# Patient Record
Sex: MALE | Race: WHITE | Employment: UNEMPLOYED | URBAN - METROPOLITAN AREA
[De-identification: names, ages, dates, MRNs, and addresses within clinical notes are randomized per-mention and may not be internally consistent; named-entity substitution may affect disease eponyms.]

---
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
## 2022-02-17 ENCOUNTER — OFFICE VISIT (OUTPATIENT)
Dept: PEDIATRIC ENDOCRINOLOGY | Age: 22
End: 2022-02-17
Payer: MEDICAID

## 2022-02-17 VITALS
BODY MASS INDEX: 21.06 KG/M2 | DIASTOLIC BLOOD PRESSURE: 71 MMHG | HEIGHT: 72 IN | OXYGEN SATURATION: 100 % | TEMPERATURE: 98.1 F | WEIGHT: 155.5 LBS | SYSTOLIC BLOOD PRESSURE: 137 MMHG | HEART RATE: 106 BPM | RESPIRATION RATE: 17 BRPM

## 2022-02-17 DIAGNOSIS — E03.9 PRIMARY HYPOTHYROIDISM: Primary | ICD-10-CM

## 2022-02-17 PROCEDURE — 99214 OFFICE O/P EST MOD 30 MIN: CPT | Performed by: PEDIATRICS

## 2022-02-17 NOTE — PATIENT INSTRUCTIONS
The signs and symptoms of hypothyroidism include:     Tiredness  Modest weight gain (no more than 5-10 lb)  Feeling cold  Dry skin  Hair loss  Constipation  Often, your childs doctor will be able to palpate an enlarged thyroid  gland in the neck. This is called a goiter.   Avoid soy products, iron tablets, calcium tablets and MVI 3-4 hours of either side of taking thyroid medication

## 2022-02-17 NOTE — PROGRESS NOTES
Chief Complaint   Patient presents with    Follow-up     Thyroid     Patient states that he takes vitamin C, B, D3, and garlic supplement. Unsure of specific dosages.

## 2022-02-17 NOTE — PROGRESS NOTES
Cc: 1. Primary hypothyroidism     HOP:   1. Patient is 21 years and 2 months old with primary hypothyroidism and is on levothyroxine 37.5 mcg (1.5 tab of 25 mcg) per day. Compliance with medication is better, misses 1 day per week. Patient takes the medication in the afternoon. Patient has good energy, has normal bowel movements. ROS: no bone pain, muscle cramps  no abdominal pain, normal bowel movements Good energy, no weakness      Family history: diabetes: yes, thyroid dysfunction: no. Social history:working at [employer]. Visit Vitals  /71 (BP 1 Location: Right arm, BP Patient Position: Sitting)   Pulse (!) 106   Temp 98.1 °F (36.7 °C) (Oral)   Resp 17   Ht 6' 0.13\" (1.832 m)   Wt 155 lb 8 oz (70.5 kg)   SpO2 100%   BMI 21.02 kg/m²   Neck is supple, no lymphadenopathy, no thyromegaly, no dark circles around the neck   Pulse equal and normal rhythm  Abdomen is nondistended   Repeat pulse= 90/min and he is anxious    Labs from last visit reviewed:   Lab Results   Component Value Date/Time    TSH 2.230 05/21/2021 04:11 PM   Neck is supple, no lymphadenopathy, no thyromegaly, no dark circles around the neck, pulse equal and normal rhythm  Abdomen is soft, nondistended      Labs from last visit reviewed:         Lab Results   Component Value Date/Time     TSH 3.010 12/01/2020 03:53 PM         A/P:   1. Primary hypothyroidism: will check free T4 and TSH. Continue levothyroxine at 37.5 mcg per day, importance of taking the medication and effect on linear growth and metabolism was reviewed, patient expressed understanding. 2.  Anxious and has elevated pulse and blood pressure requested mom to get blood pressure reading from the PCP office or to the pharmacy and call and update us and mom expressed understanding.  He gest anxious every time he comes to doctor visit.       The signs and symptoms of hypothyroidism include:     Tiredness  Modest weight gain (no more than 5-10 lb)  Feeling cold  Dry skin  Hair loss  Constipation  Poor growth  Often, your childs doctor will be able to palpate an enlarged thyroid  gland in the neck. This is called a goiter. Avoid soy products, iron tablets, calcium tablets and MVI 3-4 hours of either side of taking thyroid medication. Follow up in 9 months.

## 2022-02-18 LAB — TSH SERPL DL<=0.005 MIU/L-ACNC: 3.06 UIU/ML (ref 0.45–4.5)

## 2022-03-18 PROBLEM — F84.5 ASPERGER'S DISORDER: Status: ACTIVE | Noted: 2018-09-07

## 2022-03-19 PROBLEM — R11.0 CHRONIC NAUSEA: Status: ACTIVE | Noted: 2018-09-07

## 2022-03-19 PROBLEM — F41.9 ANXIETY: Status: ACTIVE | Noted: 2018-09-07

## 2022-04-08 RX ORDER — LEVOTHYROXINE SODIUM 25 UG/1
TABLET ORAL
Qty: 135 TABLET | Refills: 4 | Status: SHIPPED | OUTPATIENT
Start: 2022-04-08

## 2022-10-17 ENCOUNTER — OFFICE VISIT (OUTPATIENT)
Dept: URGENT CARE | Age: 22
End: 2022-10-17
Payer: MEDICAID

## 2022-10-17 VITALS
TEMPERATURE: 98.2 F | WEIGHT: 150 LBS | OXYGEN SATURATION: 100 % | BODY MASS INDEX: 20.27 KG/M2 | SYSTOLIC BLOOD PRESSURE: 132 MMHG | DIASTOLIC BLOOD PRESSURE: 72 MMHG | RESPIRATION RATE: 18 BRPM | HEART RATE: 79 BPM

## 2022-10-17 DIAGNOSIS — K52.9 ACUTE GASTROENTERITIS: Primary | ICD-10-CM

## 2022-10-17 PROCEDURE — 99203 OFFICE O/P NEW LOW 30 MIN: CPT | Performed by: NURSE PRACTITIONER

## 2022-10-17 NOTE — PROGRESS NOTES
Abdominal Pain   The history is provided by the Patient. This is a new problem. The current episode started 12 to 24 hours ago. The problem has not changed since onset. The pain is at a severity of 0/10. Associated symptoms include diarrhea and nausea. Pertinent negatives include no fever. Onset this morning. Diarrhea x 2. Saw mucous, no blood. No fevers. No other family members affected. No treatment to date. Past Medical History:   Diagnosis Date    Asperger's disorder         History reviewed. No pertinent surgical history. Family History   Problem Relation Age of Onset    No Known Problems Mother     No Known Problems Father         Social History     Socioeconomic History    Marital status: SINGLE     Spouse name: Not on file    Number of children: Not on file    Years of education: Not on file    Highest education level: Not on file   Occupational History    Not on file   Tobacco Use    Smoking status: Never    Smokeless tobacco: Never   Substance and Sexual Activity    Alcohol use: No    Drug use: No    Sexual activity: Never   Other Topics Concern    Not on file   Social History Narrative    Not on file     Social Determinants of Health     Financial Resource Strain: Not on file   Food Insecurity: Not on file   Transportation Needs: Not on file   Physical Activity: Not on file   Stress: Not on file   Social Connections: Not on file   Intimate Partner Violence: Not on file   Housing Stability: Not on file                ALLERGIES: Ibuprofen    Review of Systems   Constitutional:  Negative for chills and fever. HENT:  Negative for congestion and sore throat. Respiratory:  Negative for cough. Gastrointestinal:  Positive for abdominal pain, diarrhea and nausea.      Vitals:    10/17/22 0956 10/17/22 0958   BP:  132/72   Pulse:  79   Resp:  18   Temp:  98.2 °F (36.8 °C)   SpO2:  100%   Weight: 150 lb (68 kg)        Physical Exam  Constitutional:       General: He is not in acute distress. Appearance: Normal appearance. He is not ill-appearing or toxic-appearing. HENT:      Head: Normocephalic and atraumatic. Nose: Nose normal.      Mouth/Throat:      Mouth: Mucous membranes are moist.      Pharynx: Oropharynx is clear. Eyes:      Extraocular Movements: Extraocular movements intact. Conjunctiva/sclera: Conjunctivae normal.      Pupils: Pupils are equal, round, and reactive to light. Cardiovascular:      Rate and Rhythm: Normal rate and regular rhythm. Pulmonary:      Effort: Pulmonary effort is normal.      Breath sounds: Normal breath sounds. Abdominal:      General: Abdomen is flat. Bowel sounds are increased. There is no distension. Palpations: Abdomen is soft. Tenderness: There is no abdominal tenderness. There is no guarding or rebound. Musculoskeletal:      Cervical back: Normal range of motion and neck supple. Lymphadenopathy:      Cervical: No cervical adenopathy. Skin:     General: Skin is warm and dry. Neurological:      Mental Status: He is alert. ICD-10-CM ICD-9-CM   1. Acute gastroenteritis  K52.9 558.9       No orders of the defined types were placed in this encounter. Clear liquids, crackers/toast, advance to bland diet slowly. May try pepto bismol or imodium as needed. The patient is to follow up with PCP. If signs and symptoms become worse the pt is to go to the ER.         Graham Brown NP      MDM    Procedures

## 2022-11-17 ENCOUNTER — OFFICE VISIT (OUTPATIENT)
Dept: PEDIATRIC ENDOCRINOLOGY | Age: 22
End: 2022-11-17
Payer: MEDICAID

## 2022-11-17 VITALS
DIASTOLIC BLOOD PRESSURE: 70 MMHG | SYSTOLIC BLOOD PRESSURE: 137 MMHG | TEMPERATURE: 98 F | OXYGEN SATURATION: 98 % | HEART RATE: 114 BPM | BODY MASS INDEX: 20.18 KG/M2 | HEIGHT: 72 IN | WEIGHT: 149 LBS

## 2022-11-17 DIAGNOSIS — E03.9 PRIMARY HYPOTHYROIDISM: Primary | ICD-10-CM

## 2022-11-17 PROCEDURE — 99214 OFFICE O/P EST MOD 30 MIN: CPT | Performed by: PEDIATRICS

## 2022-11-17 NOTE — PROGRESS NOTES
Cc: 1. Primary hypothyroidism         2. Anxious    [facility]:   1. Patient is 25 years and 8 months old with primary hypothyroidism and is on levothyroxine 37.5 mcg (1.5 tab of 25 mcg) per day. Compliance with medication is better, misses 1 day per week. Patient takes the medication in the evening. Patient has good energy, has normal bowel movements. ROS: no bone pain, muscle cramps  no abdominal pain, normal bowel movements Good energy, no weakness . Patient is anxious when he visits doctor office. Family history: diabetes: yes, thyroid dysfunction: no. Social history:working at [employer]. Visit Vitals  /70 (BP 1 Location: Left upper arm, BP Patient Position: Sitting)   Pulse (!) 114   Temp 98 °F (36.7 °C) (Temporal)   Ht 5' 11.81\" (1.824 m)   Wt 149 lb (67.6 kg)   SpO2 98%   BMI 20.31 kg/m²   Neck is supple, no lymphadenopathy, no thyromegaly, no dark circles around the neck   Pulse equal and normal rhythm  Abdomen is nondistended   Repeat pulse= 96/min and he is anxious    Labs from last visit reviewed:   Lab Results   Component Value Date/Time    TSH 3.060 02/17/2022 12:00 AM   Neck is supple, no lymphadenopathy, no thyromegaly, no dark circles around the neck, pulse equal and normal rhythm  Abdomen is soft, nondistended      Labs from last visit reviewed:         Lab Results   Component Value Date/Time     TSH 3.010 12/01/2020 03:53 PM         A/P:   1. Primary hypothyroidism: will check free T4 and TSH. Continue levothyroxine at 37.5 mcg per day, importance of taking the medication and effect on linear growth and metabolism was reviewed, patient expressed understanding. 2.  Anxious and has elevated pulse and blood pressure requested and he had normal values when they got some readings from home. He gest anxious every time he comes to doctor visit.        The signs and symptoms of hypothyroidism include:     Tiredness  Modest weight gain (no more than 5-10 lb)  Feeling cold  Dry skin  Hair loss  Constipation  Poor growth  Often, your childs doctor will be able to palpate an enlarged thyroid  gland in the neck. This is called a goiter. Avoid soy products, iron tablets, calcium tablets and MVI 3-4 hours of either side of taking thyroid medication. Follow up in 9 months.

## 2022-11-17 NOTE — PATIENT INSTRUCTIONS
Take thyroid medication preferably on empty stomach    Avoid calcium tablets, iron tablets, soy products and Multivitamin 3-4 hours on either side of taking the thyroid medication. Importance of taking the medication and effect on linear growth and metabolism was reviewed,     The signs and symptoms of hypothyroidism include:     Tiredness  Modest weight gain (no more than 5-10 lb)  Feeling cold  Dry skin  Hair loss  Constipation  Poor growth  Often, your childs doctor will be able to palpate an enlarged thyroid  gland in the neck. This is called a goiter.

## 2022-11-18 LAB — TSH SERPL DL<=0.005 MIU/L-ACNC: 2.77 UIU/ML (ref 0.45–4.5)

## 2023-05-24 RX ORDER — LEVOTHYROXINE SODIUM 0.03 MG/1
TABLET ORAL
COMMUNITY
Start: 2022-04-08 | End: 2023-06-29

## 2023-06-29 RX ORDER — LEVOTHYROXINE SODIUM 0.03 MG/1
TABLET ORAL
Qty: 45 TABLET | Refills: 1 | Status: SHIPPED | OUTPATIENT
Start: 2023-06-29

## 2023-08-17 ENCOUNTER — OFFICE VISIT (OUTPATIENT)
Age: 23
End: 2023-08-17

## 2023-08-17 VITALS
RESPIRATION RATE: 19 BRPM | BODY MASS INDEX: 20.02 KG/M2 | SYSTOLIC BLOOD PRESSURE: 119 MMHG | OXYGEN SATURATION: 99 % | WEIGHT: 147.8 LBS | DIASTOLIC BLOOD PRESSURE: 77 MMHG | HEIGHT: 72 IN | HEART RATE: 92 BPM

## 2023-08-17 DIAGNOSIS — E03.9 HYPOTHYROIDISM, UNSPECIFIED TYPE: Primary | ICD-10-CM

## 2023-08-17 PROCEDURE — 99214 OFFICE O/P EST MOD 30 MIN: CPT | Performed by: PEDIATRICS

## 2023-08-17 NOTE — PROGRESS NOTES
Identified patient with two patient identifiers- name and . Reviewed record in preparation for visit and have obtained necessary documentation. No chief complaint on file.

## 2023-08-18 LAB
T4 FREE SERPL-MCNC: 1.28 NG/DL (ref 0.82–1.77)
TSH SERPL DL<=0.005 MIU/L-ACNC: 2.53 UIU/ML (ref 0.45–4.5)

## 2023-08-18 RX ORDER — LEVOTHYROXINE SODIUM 0.03 MG/1
TABLET ORAL
Qty: 135 TABLET | Refills: 3 | Status: SHIPPED | OUTPATIENT
Start: 2023-08-18

## 2024-03-08 NOTE — PROGRESS NOTES
Cc: 1. Primary hypothyroidism            [facility]:   1. Patient is 14 years and 8 months old with primary hypothyroidism and is on levothyroxine 37.5 mcg per day. Compliance with medication is poor, has missed on and off during weekdays and misses most Sunday. Patient takes the medication in the morning. Patient has good energy, has normal bowel movements.      ROS: no bone pain, muscle cramps  no abdominal pain, normal bowel movements Good energy, no weakness      Family history: diabetes: yes, thyroid dysfunction: no. Social history: doing well at school. Visit Vitals  /80 (BP 1 Location: Left arm, BP Patient Position: Sitting)   Pulse 82   Temp 97.9 °F (36.6 °C) (Oral)   Resp 19   Ht 5' 10.95\" (1.802 m)   Wt 146 lb 3.2 oz (66.3 kg)   SpO2 100%   BMI 20.42 kg/m²     Neck is supple, no lymphadenopathy, no thyromegaly, no dark circles around the neck S1 s2 heard normal rhythm  Abdomen is nondistended     Labs from last visit reviewed:   Lab Results   Component Value Date/Time    TSH 4.430 08/05/2019 03:19 PM     A/P:   1. Primary  hypothyroidism: will check free T4 and TSH   Continue levothyroxine at 37.5 mcg per day. Follow up in 6 months.
Chief Complaint   Patient presents with    Follow-up    Thyroid Problem     No new concerns this visit.
yes

## 2024-08-26 RX ORDER — LEVOTHYROXINE SODIUM 25 UG/1
TABLET ORAL
Qty: 135 TABLET | Refills: 3 | Status: SHIPPED | OUTPATIENT
Start: 2024-08-26